# Patient Record
Sex: MALE | Race: WHITE | NOT HISPANIC OR LATINO | URBAN - METROPOLITAN AREA
[De-identification: names, ages, dates, MRNs, and addresses within clinical notes are randomized per-mention and may not be internally consistent; named-entity substitution may affect disease eponyms.]

---

## 2017-04-20 ENCOUNTER — OUTPATIENT (OUTPATIENT)
Dept: OUTPATIENT SERVICES | Facility: HOSPITAL | Age: 77
LOS: 1 days | End: 2017-04-20
Payer: MEDICARE

## 2017-04-20 VITALS
WEIGHT: 167.99 LBS | TEMPERATURE: 99 F | HEART RATE: 95 BPM | RESPIRATION RATE: 16 BRPM | HEIGHT: 68 IN | SYSTOLIC BLOOD PRESSURE: 140 MMHG | DIASTOLIC BLOOD PRESSURE: 72 MMHG | OXYGEN SATURATION: 98 %

## 2017-04-20 DIAGNOSIS — R94.39 ABNORMAL RESULT OF OTHER CARDIOVASCULAR FUNCTION STUDY: ICD-10-CM

## 2017-04-20 DIAGNOSIS — Z98.890 OTHER SPECIFIED POSTPROCEDURAL STATES: Chronic | ICD-10-CM

## 2017-04-20 LAB
ALBUMIN SERPL ELPH-MCNC: 4.4 G/DL — SIGNIFICANT CHANGE UP (ref 3.3–5)
ALP SERPL-CCNC: 64 U/L — SIGNIFICANT CHANGE UP (ref 40–120)
ALT FLD-CCNC: 19 U/L RC — SIGNIFICANT CHANGE UP (ref 10–45)
ANION GAP SERPL CALC-SCNC: 15 MMOL/L — SIGNIFICANT CHANGE UP (ref 5–17)
AST SERPL-CCNC: 19 U/L — SIGNIFICANT CHANGE UP (ref 10–40)
BILIRUB SERPL-MCNC: 0.5 MG/DL — SIGNIFICANT CHANGE UP (ref 0.2–1.2)
BUN SERPL-MCNC: 16 MG/DL — SIGNIFICANT CHANGE UP (ref 7–23)
CALCIUM SERPL-MCNC: 9.6 MG/DL — SIGNIFICANT CHANGE UP (ref 8.4–10.5)
CHLORIDE SERPL-SCNC: 104 MMOL/L — SIGNIFICANT CHANGE UP (ref 96–108)
CO2 SERPL-SCNC: 24 MMOL/L — SIGNIFICANT CHANGE UP (ref 22–31)
CREAT SERPL-MCNC: 0.97 MG/DL — SIGNIFICANT CHANGE UP (ref 0.5–1.3)
GLUCOSE SERPL-MCNC: 116 MG/DL — HIGH (ref 70–99)
HCT VFR BLD CALC: 45.5 % — SIGNIFICANT CHANGE UP (ref 39–50)
HGB BLD-MCNC: 15.1 G/DL — SIGNIFICANT CHANGE UP (ref 13–17)
MCHC RBC-ENTMCNC: 29.1 PG — SIGNIFICANT CHANGE UP (ref 27–34)
MCHC RBC-ENTMCNC: 33.3 GM/DL — SIGNIFICANT CHANGE UP (ref 32–36)
MCV RBC AUTO: 87.4 FL — SIGNIFICANT CHANGE UP (ref 80–100)
PLATELET # BLD AUTO: 166 K/UL — SIGNIFICANT CHANGE UP (ref 150–400)
POTASSIUM SERPL-MCNC: 4 MMOL/L — SIGNIFICANT CHANGE UP (ref 3.5–5.3)
POTASSIUM SERPL-SCNC: 4 MMOL/L — SIGNIFICANT CHANGE UP (ref 3.5–5.3)
PROT SERPL-MCNC: 7.3 G/DL — SIGNIFICANT CHANGE UP (ref 6–8.3)
RBC # BLD: 5.2 M/UL — SIGNIFICANT CHANGE UP (ref 4.2–5.8)
RBC # FLD: 11.8 % — SIGNIFICANT CHANGE UP (ref 10.3–14.5)
SODIUM SERPL-SCNC: 143 MMOL/L — SIGNIFICANT CHANGE UP (ref 135–145)
WBC # BLD: 6.8 K/UL — SIGNIFICANT CHANGE UP (ref 3.8–10.5)
WBC # FLD AUTO: 6.8 K/UL — SIGNIFICANT CHANGE UP (ref 3.8–10.5)

## 2017-04-20 PROCEDURE — 85027 COMPLETE CBC AUTOMATED: CPT

## 2017-04-20 PROCEDURE — C1769: CPT

## 2017-04-20 PROCEDURE — 93458 L HRT ARTERY/VENTRICLE ANGIO: CPT

## 2017-04-20 PROCEDURE — 80053 COMPREHEN METABOLIC PANEL: CPT

## 2017-04-20 PROCEDURE — 93005 ELECTROCARDIOGRAM TRACING: CPT

## 2017-04-20 PROCEDURE — 93010 ELECTROCARDIOGRAM REPORT: CPT

## 2017-04-20 PROCEDURE — C1887: CPT

## 2017-04-20 PROCEDURE — C1894: CPT

## 2017-04-20 RX ORDER — FLUVASTATIN SODIUM 80 MG/1
1 TABLET, FILM COATED, EXTENDED RELEASE ORAL
Qty: 0 | Refills: 0 | COMMUNITY

## 2017-04-20 RX ORDER — MAGNESIUM OXIDE 400 MG ORAL TABLET 241.3 MG
2 TABLET ORAL
Qty: 0 | Refills: 0 | COMMUNITY

## 2017-04-20 RX ORDER — VALSARTAN 80 MG/1
1 TABLET ORAL
Qty: 0 | Refills: 0 | COMMUNITY

## 2017-04-20 NOTE — H&P CARDIOLOGY - PMH
Chest pain    Hyperlipidemia    Hypertension Chest pain    Hyperlipidemia    Hypertension    Prostate cancer

## 2017-04-20 NOTE — H&P CARDIOLOGY - PSH
S/P appendectomy  done 15-20 yrs prior H/O prostate biopsy    S/P appendectomy  done 15-20 yrs prior

## 2017-04-20 NOTE — H&P CARDIOLOGY - FAMILY HISTORY
Father  Still living? No  Family history of MI (myocardial infarction), Age at diagnosis: Age Unknown

## 2017-04-20 NOTE — H&P CARDIOLOGY - HISTORY OF PRESENT ILLNESS
This is a 74 y/o gentleman with PMH HTN, HLD, who has been c/o occasional chest discomfort for past several months.  The pain comes on unrelated to activity and lasting few minutes which he was attributing to stomach origin like heartburn but lower in chest.  The pain does not radiate, no SOB, or diaphoresis associated with symptom, resolves on its own.  He underwent Stress ECHO which showed mild inducible regional wall abnormalities of the inferoapical wall, mildly positive for evidence of ischemia at maximum workload.  He present today for cardiac cath, denies any chest pain, SOB, palpitations at present. Last episode of chest pain about two weeks ago. This is a 75 y/o male, former smoker, with PMH of HTN, HLD, denies any further significant PMH, PSH or FH.  Presents to Md Braga with c/o of X2 syncopal episodes, that occurred 2 days ago, both episodes during the same day, lasted a few seconds, witnessed by pt's wife.  Espidoes happened in the bathroom, right after dinner, w/o any trauma or bodily injury. Patient denies syncope from ever happening before. Denies any other associated symptoms such as dizziness/lightheadness, dyspnea, chest pain, HA, N&V. 24 hour Holter monitor showed 18 short episodes of V tach, frequent ventricular ectopy and 4 sinus pauses of apporx 3 secs. This is a 75 y/o male, former smoker, with PMH of HTN, HLD, denies any further significant PMH, PSH or FH.  Presents to Md Braga with c/o of X2 syncopal episodes, that occurred 2 days ago, both episodes during the same day, lasted a few seconds, witnessed by pt's wife.  Espidoes happened in the bathroom, right after dinner, w/o any trauma or bodily injury. Patient denies syncope from ever happening before. Denies any other associated symptoms such as dizziness/lightheadness, palpitations, dyspnea, chest pain, HA, N&V. 24 hour Holter monitor showed 18 short episodes of V tach, frequent ventricular ectopy and 4 sinus pauses of apporx 3 secs. Patient also reports occasional chest pain, described as "twinges" at rest, that resolve promptly ( patient is a poor historian, unable to describe further his symptoms); on  5/14 cath showed non obstructive CAD. Had abnormal stress test.  Presents here today for cardiac cath with possible intervention Currently asymptomatic.     Of NOte: pt underwent prostate biopsy for elevated PSA on 12/2016 and is currently undergoing daily radiation fort total of 45 days total ( 4/19 last tx, 2.5 weeks in)- recently diagnosed low grade prostate cancer. This is a 75 y/o male, former smoker, with PMH of HTN, HLD, denies any further significant PMH, PSH or FH.  Presents to Md Braga with c/o of X2 syncopal episodes, that occurred 2 days ago, both episodes during the same day, lasted a few seconds, witnessed by pt's wife.  Espidoes happened in the bathroom, right after dinner, w/o any trauma or bodily injury. Patient denies syncope from ever happening before. Denies any other associated symptoms such as dizziness/lightheadness, palpitations, dyspnea, chest pain, HA, N&V. 24 hour Holter monitor showed 18 short episodes of V tach, frequent ventricular ectopy and 4 sinus pauses of apporx 3 secs. Patient also reports occasional chest pain, described as "twinges" at rest, that resolve promptly ( patient is a poor historian, unable to describe further his symptoms); on  5/14 cath showed non obstructive CAD. Had abnormal stress test ( as per Md note, report not available).  Presents here today for cardiac cath with possible intervention Currently asymptomatic.     Of NOte: pt underwent prostate biopsy for elevated PSA on 12/2016 and is currently undergoing daily radiation fort total of 45 days total ( 4/19 last tx, 2.5 weeks in)- recently diagnosed low grade prostate cancer.

## 2018-05-25 ENCOUNTER — APPOINTMENT (OUTPATIENT)
Dept: FAMILY MEDICINE | Facility: CLINIC | Age: 78
End: 2018-05-25
Payer: MEDICARE

## 2018-05-25 VITALS
BODY MASS INDEX: 25.58 KG/M2 | HEIGHT: 67 IN | WEIGHT: 163 LBS | DIASTOLIC BLOOD PRESSURE: 60 MMHG | HEART RATE: 94 BPM | SYSTOLIC BLOOD PRESSURE: 108 MMHG | TEMPERATURE: 97.9 F | RESPIRATION RATE: 17 BRPM | OXYGEN SATURATION: 96 %

## 2018-05-25 DIAGNOSIS — Z87.891 PERSONAL HISTORY OF NICOTINE DEPENDENCE: ICD-10-CM

## 2018-05-25 DIAGNOSIS — Z85.46 PERSONAL HISTORY OF MALIGNANT NEOPLASM OF PROSTATE: ICD-10-CM

## 2018-05-25 DIAGNOSIS — I10 ESSENTIAL (PRIMARY) HYPERTENSION: ICD-10-CM

## 2018-05-25 DIAGNOSIS — I48.0 PAROXYSMAL ATRIAL FIBRILLATION: ICD-10-CM

## 2018-05-25 DIAGNOSIS — R55 SYNCOPE AND COLLAPSE: ICD-10-CM

## 2018-05-25 PROCEDURE — 99202 OFFICE O/P NEW SF 15 MIN: CPT

## 2018-05-25 RX ORDER — FLUVASTATIN SODIUM 80 MG/1
80 TABLET, EXTENDED RELEASE ORAL
Qty: 90 | Refills: 0 | Status: ACTIVE | COMMUNITY
Start: 2018-04-09

## 2018-05-25 RX ORDER — RIVAROXABAN 20 MG/1
20 TABLET, FILM COATED ORAL
Qty: 90 | Refills: 0 | Status: ACTIVE | COMMUNITY
Start: 2018-03-19

## 2018-05-25 RX ORDER — OMEGA-3/DHA/EPA/FISH OIL 300-1000MG
CAPSULE ORAL
Refills: 0 | Status: ACTIVE | COMMUNITY

## 2018-05-25 NOTE — PHYSICAL EXAM
[No Acute Distress] : no acute distress [Clear to Auscultation] : lungs were clear to auscultation bilaterally [No Carotid Bruits] : no carotid bruits [Normal Gait] : normal gait [Alert and Oriented x3] : oriented to person, place, and time [de-identified] : irreg

## 2018-05-25 NOTE — ASSESSMENT
[FreeTextEntry1] : Pt presents to office for initial visit and transitioning into care.Pt has hx of prostate cancer and had radiation treatment which has been  completed last year. Pt had jx of fainting after urinating last year. Went to Cardio and referred to specialist for arrhythmia.Pt has hx of a-fib .No pacemaker but has "chip". On Xarelto.Denies palpitations. \par \par Pt to get previous records\par RTO prn.Discussed Immunizations.pt will get record from Pharmacy

## 2018-05-25 NOTE — HISTORY OF PRESENT ILLNESS
[FreeTextEntry8] : Pt presents to office for initial visit and transitioning into care.Pt has hx of prostate cancer and had radiation treatment which has been  completed last year. Pt had jx of fainting after urinating last year. Went to Cardio and referred to specialist for arrhythmia.Pt has hx of a-fib .No pacemaker but has "chip". On Xarelto.Denies palpitations.

## 2018-11-04 ENCOUNTER — INPATIENT (INPATIENT)
Facility: HOSPITAL | Age: 78
LOS: 0 days | Discharge: HOME | DRG: 641 | End: 2018-11-05
Attending: HOSPITALIST | Admitting: HOSPITALIST
Payer: COMMERCIAL

## 2018-11-04 ENCOUNTER — TRANSCRIPTION ENCOUNTER (OUTPATIENT)
Age: 78
End: 2018-11-04

## 2018-11-04 VITALS
HEART RATE: 70 BPM | RESPIRATION RATE: 18 BRPM | SYSTOLIC BLOOD PRESSURE: 114 MMHG | DIASTOLIC BLOOD PRESSURE: 66 MMHG | OXYGEN SATURATION: 100 %

## 2018-11-04 DIAGNOSIS — R55 SYNCOPE AND COLLAPSE: ICD-10-CM

## 2018-11-04 DIAGNOSIS — I48.91 UNSPECIFIED ATRIAL FIBRILLATION: ICD-10-CM

## 2018-11-04 DIAGNOSIS — R31.0 GROSS HEMATURIA: ICD-10-CM

## 2018-11-04 DIAGNOSIS — C61 MALIGNANT NEOPLASM OF PROSTATE: ICD-10-CM

## 2018-11-04 DIAGNOSIS — Z98.890 OTHER SPECIFIED POSTPROCEDURAL STATES: Chronic | ICD-10-CM

## 2018-11-04 DIAGNOSIS — Z29.9 ENCOUNTER FOR PROPHYLACTIC MEASURES, UNSPECIFIED: ICD-10-CM

## 2018-11-04 LAB
ALBUMIN SERPL ELPH-MCNC: 4.6 G/DL — SIGNIFICANT CHANGE UP (ref 3.3–5)
ALP SERPL-CCNC: 66 U/L — SIGNIFICANT CHANGE UP (ref 40–120)
ALT FLD-CCNC: 14 U/L — SIGNIFICANT CHANGE UP (ref 10–45)
ANION GAP SERPL CALC-SCNC: 15 MMOL/L — SIGNIFICANT CHANGE UP (ref 5–17)
APPEARANCE UR: ABNORMAL
APTT BLD: 25.2 SEC — LOW (ref 27.5–36.3)
AST SERPL-CCNC: 13 U/L — SIGNIFICANT CHANGE UP (ref 10–40)
BACTERIA # UR AUTO: NEGATIVE — SIGNIFICANT CHANGE UP
BASE EXCESS BLDV CALC-SCNC: -0.4 MMOL/L — SIGNIFICANT CHANGE UP (ref -2–2)
BASE EXCESS BLDV CALC-SCNC: 0.5 MMOL/L — SIGNIFICANT CHANGE UP (ref -2–2)
BASOPHILS # BLD AUTO: 0 K/UL — SIGNIFICANT CHANGE UP (ref 0–0.2)
BASOPHILS NFR BLD AUTO: 0.1 % — SIGNIFICANT CHANGE UP (ref 0–2)
BILIRUB SERPL-MCNC: 0.6 MG/DL — SIGNIFICANT CHANGE UP (ref 0.2–1.2)
BILIRUB UR-MCNC: NEGATIVE — SIGNIFICANT CHANGE UP
BUN SERPL-MCNC: 17 MG/DL — SIGNIFICANT CHANGE UP (ref 7–23)
CA-I SERPL-SCNC: 1.23 MMOL/L — SIGNIFICANT CHANGE UP (ref 1.12–1.3)
CA-I SERPL-SCNC: 1.3 MMOL/L — SIGNIFICANT CHANGE UP (ref 1.12–1.3)
CALCIUM SERPL-MCNC: 9.9 MG/DL — SIGNIFICANT CHANGE UP (ref 8.4–10.5)
CHLORIDE BLDV-SCNC: 107 MMOL/L — SIGNIFICANT CHANGE UP (ref 96–108)
CHLORIDE BLDV-SCNC: 110 MMOL/L — HIGH (ref 96–108)
CHLORIDE SERPL-SCNC: 105 MMOL/L — SIGNIFICANT CHANGE UP (ref 96–108)
CO2 BLDV-SCNC: 26 MMOL/L — SIGNIFICANT CHANGE UP (ref 22–30)
CO2 BLDV-SCNC: 26 MMOL/L — SIGNIFICANT CHANGE UP (ref 22–30)
CO2 SERPL-SCNC: 24 MMOL/L — SIGNIFICANT CHANGE UP (ref 22–31)
COLOR SPEC: ABNORMAL
CREAT SERPL-MCNC: 1.09 MG/DL — SIGNIFICANT CHANGE UP (ref 0.5–1.3)
DIFF PNL FLD: ABNORMAL
EOSINOPHIL # BLD AUTO: 0.2 K/UL — SIGNIFICANT CHANGE UP (ref 0–0.5)
EOSINOPHIL NFR BLD AUTO: 2.6 % — SIGNIFICANT CHANGE UP (ref 0–6)
EPI CELLS # UR: 1 /HPF — SIGNIFICANT CHANGE UP
GAS PNL BLDV: 141 MMOL/L — SIGNIFICANT CHANGE UP (ref 136–145)
GAS PNL BLDV: 142 MMOL/L — SIGNIFICANT CHANGE UP (ref 136–145)
GAS PNL BLDV: SIGNIFICANT CHANGE UP
GLUCOSE BLDV-MCNC: 172 MG/DL — HIGH (ref 70–99)
GLUCOSE BLDV-MCNC: 97 MG/DL — SIGNIFICANT CHANGE UP (ref 70–99)
GLUCOSE SERPL-MCNC: 183 MG/DL — HIGH (ref 70–99)
GLUCOSE UR QL: NEGATIVE — SIGNIFICANT CHANGE UP
HCO3 BLDV-SCNC: 24 MMOL/L — SIGNIFICANT CHANGE UP (ref 21–29)
HCO3 BLDV-SCNC: 25 MMOL/L — SIGNIFICANT CHANGE UP (ref 21–29)
HCT VFR BLD CALC: 43.8 % — SIGNIFICANT CHANGE UP (ref 39–50)
HCT VFR BLDA CALC: 42 % — SIGNIFICANT CHANGE UP (ref 39–50)
HCT VFR BLDA CALC: 44 % — SIGNIFICANT CHANGE UP (ref 39–50)
HGB BLD CALC-MCNC: 13.7 G/DL — SIGNIFICANT CHANGE UP (ref 13–17)
HGB BLD CALC-MCNC: 14.5 G/DL — SIGNIFICANT CHANGE UP (ref 13–17)
HGB BLD-MCNC: 14.5 G/DL — SIGNIFICANT CHANGE UP (ref 13–17)
HYALINE CASTS # UR AUTO: 2 /LPF — SIGNIFICANT CHANGE UP (ref 0–2)
INR BLD: 1.21 RATIO — HIGH (ref 0.88–1.16)
KETONES UR-MCNC: SIGNIFICANT CHANGE UP
LACTATE BLDV-MCNC: 1.6 MMOL/L — SIGNIFICANT CHANGE UP (ref 0.7–2)
LACTATE BLDV-MCNC: 3.2 MMOL/L — HIGH (ref 0.7–2)
LEUKOCYTE ESTERASE UR-ACNC: ABNORMAL
LYMPHOCYTES # BLD AUTO: 1.3 K/UL — SIGNIFICANT CHANGE UP (ref 1–3.3)
LYMPHOCYTES # BLD AUTO: 18.8 % — SIGNIFICANT CHANGE UP (ref 13–44)
MCHC RBC-ENTMCNC: 28.1 PG — SIGNIFICANT CHANGE UP (ref 27–34)
MCHC RBC-ENTMCNC: 33.1 GM/DL — SIGNIFICANT CHANGE UP (ref 32–36)
MCV RBC AUTO: 84.8 FL — SIGNIFICANT CHANGE UP (ref 80–100)
MONOCYTES # BLD AUTO: 0.5 K/UL — SIGNIFICANT CHANGE UP (ref 0–0.9)
MONOCYTES NFR BLD AUTO: 7.6 % — SIGNIFICANT CHANGE UP (ref 2–14)
NEUTROPHILS # BLD AUTO: 4.8 K/UL — SIGNIFICANT CHANGE UP (ref 1.8–7.4)
NEUTROPHILS NFR BLD AUTO: 71 % — SIGNIFICANT CHANGE UP (ref 43–77)
NITRITE UR-MCNC: NEGATIVE — SIGNIFICANT CHANGE UP
OTHER CELLS CSF MANUAL: 4 ML/DL — LOW (ref 18–22)
OTHER CELLS CSF MANUAL: 6 ML/DL — LOW (ref 18–22)
PCO2 BLDV: 43 MMHG — SIGNIFICANT CHANGE UP (ref 35–50)
PCO2 BLDV: 43 MMHG — SIGNIFICANT CHANGE UP (ref 35–50)
PH BLDV: 7.37 — SIGNIFICANT CHANGE UP (ref 7.35–7.45)
PH BLDV: 7.39 — SIGNIFICANT CHANGE UP (ref 7.35–7.45)
PH UR: 7 — SIGNIFICANT CHANGE UP (ref 5–8)
PLATELET # BLD AUTO: 172 K/UL — SIGNIFICANT CHANGE UP (ref 150–400)
PO2 BLDV: <50 MMHG — LOW (ref 25–45)
PO2 BLDV: <50 MMHG — LOW (ref 25–45)
POTASSIUM BLDV-SCNC: 4 MMOL/L — SIGNIFICANT CHANGE UP (ref 3.5–5.3)
POTASSIUM BLDV-SCNC: 4.3 MMOL/L — SIGNIFICANT CHANGE UP (ref 3.5–5.3)
POTASSIUM SERPL-MCNC: 4.4 MMOL/L — SIGNIFICANT CHANGE UP (ref 3.5–5.3)
POTASSIUM SERPL-SCNC: 4.4 MMOL/L — SIGNIFICANT CHANGE UP (ref 3.5–5.3)
PROT SERPL-MCNC: 6.9 G/DL — SIGNIFICANT CHANGE UP (ref 6–8.3)
PROT UR-MCNC: ABNORMAL
PROTHROM AB SERPL-ACNC: 13.9 SEC — HIGH (ref 10–12.9)
RBC # BLD: 5.16 M/UL — SIGNIFICANT CHANGE UP (ref 4.2–5.8)
RBC # FLD: 12 % — SIGNIFICANT CHANGE UP (ref 10.3–14.5)
RBC CASTS # UR COMP ASSIST: 295 /HPF — HIGH (ref 0–4)
SAO2 % BLDV: 22 % — LOW (ref 67–88)
SAO2 % BLDV: 32 % — LOW (ref 67–88)
SODIUM SERPL-SCNC: 144 MMOL/L — SIGNIFICANT CHANGE UP (ref 135–145)
SP GR SPEC: 1.02 — SIGNIFICANT CHANGE UP (ref 1.01–1.02)
TROPONIN T, HIGH SENSITIVITY RESULT: 10 NG/L — SIGNIFICANT CHANGE UP (ref 0–51)
UROBILINOGEN FLD QL: NEGATIVE — SIGNIFICANT CHANGE UP
WBC # BLD: 6.7 K/UL — SIGNIFICANT CHANGE UP (ref 3.8–10.5)
WBC # FLD AUTO: 6.7 K/UL — SIGNIFICANT CHANGE UP (ref 3.8–10.5)
WBC UR QL: 18 /HPF — HIGH (ref 0–5)

## 2018-11-04 PROCEDURE — 72100 X-RAY EXAM L-S SPINE 2/3 VWS: CPT | Mod: 26

## 2018-11-04 PROCEDURE — 70450 CT HEAD/BRAIN W/O DYE: CPT | Mod: 26

## 2018-11-04 PROCEDURE — 99236 HOSP IP/OBS SAME DATE HI 85: CPT | Mod: GC

## 2018-11-04 PROCEDURE — 72070 X-RAY EXAM THORAC SPINE 2VWS: CPT | Mod: 26

## 2018-11-04 PROCEDURE — 72125 CT NECK SPINE W/O DYE: CPT | Mod: 26

## 2018-11-04 RX ORDER — DOCUSATE SODIUM 100 MG
100 CAPSULE ORAL DAILY
Qty: 0 | Refills: 0 | Status: DISCONTINUED | OUTPATIENT
Start: 2018-11-04 | End: 2018-11-05

## 2018-11-04 RX ORDER — SODIUM CHLORIDE 9 MG/ML
1000 INJECTION INTRAMUSCULAR; INTRAVENOUS; SUBCUTANEOUS ONCE
Qty: 0 | Refills: 0 | Status: COMPLETED | OUTPATIENT
Start: 2018-11-04 | End: 2018-11-04

## 2018-11-04 RX ORDER — SENNA PLUS 8.6 MG/1
1 TABLET ORAL DAILY
Qty: 0 | Refills: 0 | Status: DISCONTINUED | OUTPATIENT
Start: 2018-11-04 | End: 2018-11-05

## 2018-11-04 RX ORDER — PREGABALIN 225 MG/1
1000 CAPSULE ORAL DAILY
Qty: 0 | Refills: 0 | Status: DISCONTINUED | OUTPATIENT
Start: 2018-11-04 | End: 2018-11-05

## 2018-11-04 RX ORDER — CHOLECALCIFEROL (VITAMIN D3) 125 MCG
1000 CAPSULE ORAL DAILY
Qty: 0 | Refills: 0 | Status: DISCONTINUED | OUTPATIENT
Start: 2018-11-04 | End: 2018-11-05

## 2018-11-04 RX ORDER — SODIUM CHLORIDE 9 MG/ML
1000 INJECTION INTRAMUSCULAR; INTRAVENOUS; SUBCUTANEOUS
Qty: 0 | Refills: 0 | Status: DISCONTINUED | OUTPATIENT
Start: 2018-11-04 | End: 2018-11-04

## 2018-11-04 RX ORDER — TETANUS TOXOID, REDUCED DIPHTHERIA TOXOID AND ACELLULAR PERTUSSIS VACCINE, ADSORBED 5; 2.5; 8; 8; 2.5 [IU]/.5ML; [IU]/.5ML; UG/.5ML; UG/.5ML; UG/.5ML
0.5 SUSPENSION INTRAMUSCULAR ONCE
Qty: 0 | Refills: 0 | Status: COMPLETED | OUTPATIENT
Start: 2018-11-04 | End: 2018-11-04

## 2018-11-04 RX ADMIN — SODIUM CHLORIDE 1000 MILLILITER(S): 9 INJECTION INTRAMUSCULAR; INTRAVENOUS; SUBCUTANEOUS at 12:13

## 2018-11-04 RX ADMIN — Medication 30 MILLILITER(S): at 15:28

## 2018-11-04 RX ADMIN — SODIUM CHLORIDE 1000 MILLILITER(S): 9 INJECTION INTRAMUSCULAR; INTRAVENOUS; SUBCUTANEOUS at 16:32

## 2018-11-04 RX ADMIN — SODIUM CHLORIDE 1000 MILLILITER(S): 9 INJECTION INTRAMUSCULAR; INTRAVENOUS; SUBCUTANEOUS at 11:12

## 2018-11-04 RX ADMIN — TETANUS TOXOID, REDUCED DIPHTHERIA TOXOID AND ACELLULAR PERTUSSIS VACCINE, ADSORBED 0.5 MILLILITER(S): 5; 2.5; 8; 8; 2.5 SUSPENSION INTRAMUSCULAR at 11:11

## 2018-11-04 RX ADMIN — SODIUM CHLORIDE 75 MILLILITER(S): 9 INJECTION INTRAMUSCULAR; INTRAVENOUS; SUBCUTANEOUS at 14:13

## 2018-11-04 NOTE — H&P ADULT - PROBLEM SELECTOR PLAN 1
-pt with syncopal episode after straining to urinate and feeling light headed, hot, sweaty before passing out on the steps. Pts wife ran to his side, pt was consciousness with no post ictal state. Loop recorder   -likely 2/2  vasovagal Vs acute blood loss anemia Vs seizure Vs arrythmia  -IVF  -monitor vs  -Monitor on tele  _CTH negative for acute intracranial leed -pt with syncopal episode after straining to urinate and feeling light headed, hot, sweaty before passing out on the steps. Pts wife ran to his side, pt was consciousness with no post ictal state. Loop recorder   -likely 2/2  vasovagal Vs acute blood loss anemia  Vs arrythmia Vs seizure  -IVF  -monitor hematuria  -Trend CBC  -monitor vs  -Monitor on tele  -CTH negative for acute intracranial bleed -pt with syncopal episode after straining to urinate and feeling light headed, hot, sweaty before passing out on the steps. Pts wife ran to his side, pt was consciousness with no post ictal state. Loop recorder showed no arrythmia  -likely 2/2  Orthostatic hypotension Vs vasovagal Vs acute blood loss anemia  Vs arrythmia Vs less likely seizure  -IVF  -monitor hematuria  -Trend CBC  -monitor vs  -Monitor on tele  -orthostatics negative s/p IVF resuscitation   -CTH negative for acute intracranial bleed

## 2018-11-04 NOTE — H&P ADULT - NSHPSOCIALHISTORY_GEN_ALL_CORE
lives with wife, retired , former 30 pack years smoking hx quit 20 years ago. Occasional glass of wine until 2017. Denies drug use.

## 2018-11-04 NOTE — DISCHARGE NOTE ADULT - HOSPITAL COURSE
77 YO m with PMhx of prostate cancer s/p RT in 2017, and Mhx of Afib on Xarelto who presented after a syncopal episode this AM. The pt has been having hematuria for the past 3 weeks, he went to his urologist who did a cystoscopy with biopsy on October 25. He initially held his xarelto, but restarted half the dose of xarelto 4 days prior to admission.  Pt states that he had 2 large hematuria, then he went back to bed. Pt woke up in the AM, strained to urinate and started to feel light headed, hot, sweaty, and then lost consciousness.  The wife rushed to his side where she found him alert with blood on the back of his head. She called EMS who rushed him to the hospital. Pt denied any shaking, loss of urine or bowels during the episode, post ictal state, chest pain, shortness of breath, abd pain, fever, or chills.    In the ED Pt is alert and oriented 114/76. HR70, Temp97.9F, Resp 18 sat 98 on RA. CTH superficial hematoma in the posterior head, but no fracture and no intracranial bleed. CT spine, tim thoracic and lumber spine were also negative for fracture. EKG shows Afib . hgb =14.5, Trops 11, repeat 10, U/A + for  NUN=470, WBC=18,small leukocytes. Loop recorder interrogated with no events. Pt was given 1L NS and TDaP vaccine. Cardiology saw him and said to hold xarelto, monitor trops and hydrate. Urology said to follow up with Dr Fletcher as out-patient 79 YO m with PMhx of prostate cancer s/p RT in 2017, and Mhx of Afib on Xarelto who presented after a syncopal episode this AM. The pt has been having hematuria for the past 3 weeks, he went to his urologist who did a cystoscopy with biopsy on October 25. He initially held his xarelto, but restarted half the dose of xarelto 4 days prior to admission.  Pt states that he had 2 large hematuria, then he went back to bed. Pt woke up in the AM, strained to urinate and started to feel light headed, hot, sweaty, and then lost consciousness.  The wife rushed to his side where she found him alert with blood on the back of his head. She called EMS who rushed him to the hospital. Pt denied any shaking, loss of urine or bowels during the episode, post ictal state, chest pain, shortness of breath, abd pain, fever, or chills.    In the ED Pt is alert and oriented 114/76. HR70, Temp97.9F, Resp 18 sat 98 on RA. CTH superficial hematoma in the posterior head, but no fracture and no intracranial bleed. CT spine, tim thoracic and lumber spine were also negative for fracture. EKG shows Afib . hgb =14.5, Trops 11, repeat 10, U/A + for  WVW=860, WBC=18,small leukocytes. Loop recorder interrogated with no events. Pt was given 1L NS and TDaP vaccine. Cardiology saw him and said to hold xarelto, monitor trops and hydrate. Urology said to follow up with Dr Fletcher as out-patient. Troponins were negative.     Patient was given IV fluids, and was stable for discharge. Patient with follow up with his Urologist, Dr. Fletcher and PMD, Dr. Braga. 77 YO m with PMhx of prostate cancer s/p RT in 2017, and Mhx of Afib on Xarelto who presented after a syncopal episode this AM. The pt has been having hematuria for the past 3 weeks, he went to his urologist who did a cystoscopy with biopsy on October 25. He initially held his xarelto, but restarted half the dose of xarelto 4 days prior to admission.  Pt states that he had 2 large hematuria, then he went back to bed. Pt woke up in the AM, strained to urinate and started to feel light headed, hot, sweaty, and then lost consciousness.  The wife rushed to his side where she found him alert with blood on the back of his head. She called EMS who rushed him to the hospital. Pt denied any shaking, loss of urine or bowels during the episode, post ictal state, chest pain, shortness of breath, abd pain, fever, or chills.    In the ED Pt is alert and oriented 114/76. HR70, Temp97.9F, Resp 18 sat 98 on RA. CTH superficial hematoma in the posterior head, but no fracture and no intracranial bleed. CT spine, tim thoracic and lumber spine were also negative for fracture. EKG shows Afib . hgb =14.5, Trops 11, repeat 10, U/A + for  QCE=275, WBC=18,small leukocytes.   Etiology of syncope is due to dehydration.   Loop recorder interrogated with no arrhythmia. Pt was given 1L NS and TDaP vaccine. Cardiology saw him and said to hold xarelto, monitor trops and hydrate. Urology said to follow up with Dr Fletcher as out-patient. Troponins were negative (ACS was ruled out).    Patient was given IV fluids, and was stable for discharge. Patient with follow up with his Urologist, Dr. Fletcher and PMD, Dr. Braga.

## 2018-11-04 NOTE — H&P ADULT - HISTORY OF PRESENT ILLNESS
79 YO F 77 YO m with PMhx of prostate cancer s/p RT in 2017, and Mhx of Afib on xarelto who presents after a syncopal episode this AM. Pt states that he urinated twice with large amounts fo hematuria (pt thinks it was 3 x 6Oz cups) and then he went back to bed. Pt woke up this AM, strained to urinate and started to feel light headed, hot, sweaty, and then lost consciousness. The pt last remembers being on the first step holding onto the railing.  The wife rushed to his side where she found him alert with blood on the back of his head. She called EMS who rushed him to the hospital. Pt denied any shaking, loss of urine or bowels during the episode, post ictal state, chest pain, shortness of breath, abd pain, fever, or chills.  Of note, the pt has been having hematuria for the past 3 weeks, he went to his urologist who did a cystoscopy with biopsy on October 25. he initially held his xarelto, but restarted half the dose of xarelto 4 days ago.   In the ED Pt is alert and oriented 114/76. HR70, Temp97.9F, Resp 18 sat 98 on RA. CTH superficial hematoma in the posterior head, but no fracture and no intracranial bleed. CT spine, tim thoracic and lumber spine were also negative for fracture. EKG shows Afib . hgb =14.5, Trops 11, repeat 10, U/A + for  NYV=683, WBC=18,small leukocytes. Loop recorder interrogated with no events. Pt was given 1L NS and TDaP vaccine. Cardiology saw him and said to hold xarelto, monitor trops and hydrate. Urology said to follow up with Dr Fletcher as out-patient

## 2018-11-04 NOTE — H&P ADULT - NSHPLABSRESULTS_GEN_ALL_CORE
14.5   6.7   )-----------( 172      ( 2018 07:45 )             43.8           144  |  105  |  17  ----------------------------<  183<H>  4.4   |  24  |  1.09    Ca    9.9      2018 07:45    TPro  6.9  /  Alb  4.6  /  TBili  0.6  /  DBili  x   /  AST  13  /  ALT  14  /  AlkPhos  66                Urinalysis Basic - ( 2018 11:12 )    Color: Dark Yellow / Appearance: Slightly Turbid / S.017 / pH: x  Gluc: x / Ketone: Trace  / Bili: Negative / Urobili: Negative   Blood: x / Protein: 30 mg/dL / Nitrite: Negative   Leuk Esterase: Small / RBC: 295 /hpf / WBC 18 /hpf   Sq Epi: x / Non Sq Epi: 1 /hpf / Bacteria: Negative        PT/INR - ( 2018 07:45 )   PT: 13.9 sec;   INR: 1.21 ratio         PTT - ( 2018 07:45 )  PTT:25.2 sec    Lactate Trend  < from: CT Head No Cont (18 @ 10:30) >    CT BRAIN:   No acute intracranial hemorrhage, significant mass effect or midline   shift.     No displaced calvarial fracture. Tiny scalp hematoma overlies the left   parietal region.    CT CERVICAL SPINE:   No acute fracture or subluxation. Moderate cervical spondylosis.    < end of copied text >    < from: Xray Thoracic Spine 1 View (18 @ 08:37) >    IMPRESSION: There is no evidence of an acute fracture or subluxation.   There are degenerative changes of the thoracolumbar spineas manifested   by anterior osteophyte formation. Vascular calcifications are noted.   There is mild spinal scoliosis. Cardiac loop recorder is seen overlying   the left chest wall.    < end of copied text >    < from: Xray Lumbar Spine AP + Lateral (18 @ 08:37) >    IMPRESSION: There is no evidence of an acute fracture or subluxation.   There are degenerative changes of the thoracolumbar spineas manifested   by anterior osteophyte formation. Vascular calcifications are noted.   There is mild spinal scoliosis. Cardiac loop recorder is seen overlying   the left chest wall.    < end of copied text >

## 2018-11-04 NOTE — H&P ADULT - ASSESSMENT
79 YO  with PMhx of prostate CA s/p RT, afib on xarelto, 3 weeks of hematuria s/p cystoscopy with bx who presents admitted for workup of syncopal episode. 79 YO  with PMhx of prostate CA s/p RT, afib on xarelto, 3 weeks of hematuria s/p cystoscopy with bx who presents admitted for workup of syncopal episode likely orthostatic hyptotnesion Vs Vasovagal Vs less likely seizure.

## 2018-11-04 NOTE — DISCHARGE NOTE ADULT - PLAN OF CARE
follow up with your PCP within 1 week of discharge You likely had a syncopal episode after volume loss, and you were given IV fluids in the ED. You should follow up with your PCP within 1 week of discharge. If you feel dizzy or lightheaded or have another episode of syncope, please come to the ER. Please hold your xarelto as per your PMD/cardiologist, Dr. Braga. Please follow up within 1 week of discharge. You should follow up with your urologist, Dr. Fletcher, within 1 week of discharge You likely had a syncopal episode due to dehydration, and you were given IV fluids in the hospital. You should follow up with your PCP within 1 week of discharge. If you feel dizzy or lightheaded or have another episode of syncope (fainting), please come to the ER. Please hold your Xarelto as per your PMD/cardiologist, Dr. Braga. Please follow up within 1 week of discharge.

## 2018-11-04 NOTE — H&P ADULT - NSHPREVIEWOFSYSTEMS_GEN_ALL_CORE
REVIEW OF SYSTEMS:    CONSTITUTIONAL: No weakness, fevers or chills  EYES/ENT: No visual changes;  No vertigo or throat pain   NECK: No pain or stiffness  RESPIRATORY: No cough, wheezing, hemoptysis; No shortness of breath  CARDIOVASCULAR: No chest pain or palpitations  GASTROINTESTINAL: +burning epigatric pain after eating associated with belching and sensation of food getting stuck at back of throat.  No nausea, vomiting, or hematemesis; No diarrhea or constipation. No melena or hematochezia.  GENITOURINARY: +hematuria, +intermittent dysuria   NEUROLOGICAL: No numbness or weakness  SKIN: 1 inch laceration in the back of head, no active bleeding. No itching, burning, rashes, or lesions   All other review of systems is negative unless indicated above.

## 2018-11-04 NOTE — ED PROVIDER NOTE - MEDICAL DECISION MAKING DETAILS
78M on xarelto presents s/p fall at home, +LOC. Story sounds like possible syncope. VSS. 2-3cm lac on back of head, stable, not bleeding actively. Will check labs, lactate, ct head/neck. 78M on xarelto presents s/p fall at home, +LOC. Story sounds like possible syncope. VSS. 2-3cm lac on back of head, stable, not bleeding actively. Will check labs, lactate, ct head/neck. ZR 78M on xarelto presents s/p fall at home, +LOC. Pt went from seated to standing, felt dizzy, light-headed, and passed out. He hit his head, has small laceration in occipital area. No CP, no SOB. During the night had blood in his urine, thought to be 2/2 recent biopsy of the prostate, found to be positive for CA. Story sounds like possible orthostatic vasovagal episode, will give tetanus shot, repair laceration, rehydrate, and re-evaluate. VSS. 2-3cm lac on back of head, stable, not bleeding actively. Will check labs, lactate, ct head/neck. ZR

## 2018-11-04 NOTE — CONSULT NOTE ADULT - ASSESSMENT
Impression/ Plan:   Patient's current episode of syncope appears related to orthostatic syncope from severe volume depletion during preceding micturition .   Cardiac work up including 12 lead EKG and cardiac enzymes first set do not support an ischemic etiology.   ILR interrogation was reportedly performed by EP this am and results did not point to any arrythmic cause of syncope.  Neuro work up included CT head and spine that did not show any acute pathology  Will monitor with 2 more sets of CE and overnite observation.   Hold Xarelto, continue hydration and measure BP to assess orthostasis   Definitive therapy for bladder pathology and addressing cause of intermittent persistent bleed to be addressed.  Call me for any questions or updates at [phone]
78yM s/p radiotherapy with gross hematuria and s/p fall

## 2018-11-04 NOTE — ED ADULT NURSE NOTE - OBJECTIVE STATEMENT
Pt on Xarelto fell one step while trying to reach for the stair railings. Pt stated that he lost consciousness. No distress. Breathing easy and non labored. + cut at the back of the head. Pt able to move all extremities. Pt very anxious. Emotional support offered.

## 2018-11-04 NOTE — H&P ADULT - ATTENDING COMMENTS
Syncopal episode likely due to dehydration as evidenced by elevated lactate, elevated albumin, and hyaline casts on the labs.   NS IV bolus given in the ED. Orthostatic vital signs are normal.   Loop recorder interrogated - no events recorded to indicate any kind of arrhythmia to cause the syncopal episode.   ACS was also ruled out- ECG is nonischemic. hs Trop x2 sets are negative.   Hematuria is not very significant and can be safely monitored by the urologist as outpatient.   Physical exam is generally benign- reproducible point tenderness to palpation over right lower mid-sternal area consistent with costochondritis.   I discussed the case with the patient, family, and the PMD/ cardiologist. From my perspective he is now medically stable for discharge. After an additional IV saline bolus tonight, he will be provided with the discharge papers.

## 2018-11-04 NOTE — DISCHARGE NOTE ADULT - PROVIDER TOKENS
FREE:[LAST:[Dr. Braga],PHONE:[(   )    -],FAX:[(   )    -],ADDRESS:[[address] [place] [address] , [place] [address]      Phone: [phone]]]

## 2018-11-04 NOTE — CONSULT NOTE ADULT - SUBJECTIVE AND OBJECTIVE BOX
Date of Admission:    Patient is a 78y old  Male who presents with a chief complaint of passing out this morning with resultant fall and trauma to back of head and upper back.    HPI: Patient has known cardiac history of HTN, recently diagnosed A fib - On Xarelto and s/p intracardiac loop recorder implantation at [facility].   Patient also has history of notable bradycardia in the past. Patient was seen by me in office about 2 weeks ago for concerns of intermittent urethral bleeding   post surgical procedure on urinary bladder performed by Dr. Fletcher at [place]. He also reported episodic dizziness. Patient was advised on holding Xarelto   and his BP meds were titrated. He states restarting Xarelto at half the prescribed dose few days ago after discussion with his urologist.   He had reported hesitancy in micturition for last several days, however at around 2 am -this morning - he states having voided an unusually large amount of urine   mixed with blood. He reports feeling light headed on waking up and subsequently had a sudden fall impacting the back of his head and back.   He states experiencing mild tenderness in substernal area post fall, no description of typical anginal CP. He states no symptoms of palpitations or SOB.   He feels somewhat weak, however states his mentation is back to his baseline. Mild tingling reported in bases of toes.      Allergies    No Known Allergies      SOCIAL HISTORY:    [x ] Non-smoker  [ ] Smoker  [ ] Alcohol      REVIEW OF SYSTEMS:  See HPI. Otherwise, 10 point ROS done and otherwise negative.    PHYSICAL EXAM:  T(C): 36.6 (11-04-18 @ 08:59), Max: 36.6 (11-04-18 @ 08:59)  HR: 78 (11-04-18 @ 08:59) (70 - 78)  BP: 114/67 (11-04-18 @ 08:59) (114/66 - 114/67)  RR: 18 (11-04-18 @ 08:59) (18 - 18)  SpO2: 100% (11-04-18 @ 08:59) (100% - 100%)  Wt(kg): --  I&O's Summary      Appearance: Normal	  HEENT:   Normal oral mucosa, PERRL, EOMI. Laceration noted in back of skull with spotting of blood  Lymphatic: No lymphadenopathy  Cardiovascular: Normal S1 S2, Irregular No JVD, No murmurs, No edema  Respiratory: Lungs clear to auscultation	  Psychiatry: A & O x 3, Mood & affect appropriate  Gastrointestinal:  Soft, Non-tender, + BS	  Skin: No rashes, No ecchymoses, No cyanosis	  Neurologic: Non-focal  Extremities: Normal range of motion, No clubbing, cyanosis or edema  Vascular: Peripheral pulses palpable 2+ bilaterally        LABS:	 	  CBC Full  -  ( 04 Nov 2018 07:45 )  WBC Count : 6.7 K/uL  Hemoglobin : 14.5 g/dL  Hematocrit : 43.8 %  Platelet Count - Automated : 172 K/uL  Mean Cell Volume : 84.8 fl  Mean Cell Hemoglobin : 28.1 pg  Mean Cell Hemoglobin Concentration : 33.1 gm/dL  Auto Neutrophil # : 4.8 K/uL  Auto Lymphocyte # : 1.3 K/uL  Auto Monocyte # : 0.5 K/uL  Auto Eosinophil # : 0.2 K/uL  Auto Basophil # : 0.0 K/uL  Auto Neutrophil % : 71.0 %  Auto Lymphocyte % : 18.8 %  Auto Monocyte % : 7.6 %  Auto Eosinophil % : 2.6 %  Auto Basophil % : 0.1 %    11-04    144  |  105  |  17  ----------------------------<  183<H>  4.4   |  24  |  1.09    Ca    9.9      04 Nov 2018 07:45    TPro  6.9  /  Alb  4.6  /  TBili  0.6  /  DBili  x   /  AST  13  /  ALT  14  /  AlkPhos  66  11-04      proBNP:   Lipid Profile:   HgA1c:   TSH:     EKG: A fib with V rate at 100. No acute ST-T abn  CARDIAC MARKERS: Ist set neg
Urology Consult Note    Chief Complaint:  Hematuria, s/p fall    History of Present Illness:  78yM with history of prostate cancer s/p radiotherapy with hematuria. His urologist is Dr. Fletcher. Recently underwent cystoscopy with bladder biopsy. He reports this morning he was walking up his stairs at home and then fell after losing  on the rail. Presented to ER. Reports persistent intermittent gross hematuria.    PAST MEDICAL & SURGICAL HISTORY:  Hematuria    FAMILY HISTORY:      Allergies    No Known Allergies    Intolerances        Social History:  Denies tobacco or alcohol use    Review of Systems:   Constitutional: No weight loss, no weakness  HEENT: No visual loss, no hearing loss, no sneezing  Skin: No rash or itching  CV: No chest pain, no chest pressure  Pulm: No shortness of breath, cough, or sputum  GI: No melena, no constipation  : Per HPI  Neuro: No headache, dizziness  MSK: No muscle pain, no joint pain  Heme: No anemia, bruising, or bleeding  Lymphatics: No enlarged nodes, no history of splenectomy  Psych: No depression of anxiety  Endo: No cold or heat intolerance  Allergies: No asthma, hives    Physical Exam:  Vital signs  T(C): 36.6 (18 @ 08:59), Max: 36.6 (18 @ 08:59)  HR: 78 (18 @ 08:59)  BP: 114/67 (18 @ 08:59)  SpO2: 100% (18 @ 08:59)  Wt(kg): --    Gen: No acute distress. Normal mood  HEENT: Normocephalic, neck supple  CV: Hemodynamically stable  Pulm: No increased work of breathing  Abd: soft, non-tender, non-distended  Back: No CVA tenderness, no midline pain  Extremities: No significant deformity or joint abnormality  Neuro: Alert & oriented x3, No focal deficits  Skin: Skin normal color, normal texture  Psych: Normal affect, normal behavior  : Nonpalpable bladder      Labs:       @ 07:45    WBC 6.7   / Hct 43.8  / SCr 1.09         144  |  105  |  17  ----------------------------<  183<H>  4.4   |  24  |  1.09    Ca    9.9      2018 07:45    TPro  6.9  /  Alb  4.6  /  TBili  0.6  /  DBili  x   /  AST  13  /  ALT  14  /  AlkPhos  66  11-04    PT/INR - ( 2018 07:45 )   PT: 13.9 sec;   INR: 1.21 ratio         PTT - ( 2018 07:45 )  PTT:25.2 sec  Urinalysis Basic - ( 2018 11:12 )    Color: Dark Yellow / Appearance: Slightly Turbid / S.017 / pH: x  Gluc: x / Ketone: Trace  / Bili: Negative / Urobili: Negative   Blood: x / Protein: 30 mg/dL / Nitrite: Negative   Leuk Esterase: Small / RBC: 295 /hpf / WBC 18 /hpf   Sq Epi: x / Non Sq Epi: 1 /hpf / Bacteria: Negative

## 2018-11-04 NOTE — DISCHARGE NOTE ADULT - CARE PROVIDER_API CALL
Dr. Braga,   [address] [place] [address] , [place] [address]      Phone: [phone]  Phone: (   )    -  Fax: (   )    -

## 2018-11-04 NOTE — ED ADULT NURSE NOTE - NSIMPLEMENTINTERV_GEN_ALL_ED
Implemented All Fall with Harm Risk Interventions:  [place] to call system. Call bell, personal items and telephone within reach. Instruct patient to call for assistance. Room bathroom lighting operational. Non-slip footwear when patient is off stretcher. Physically safe environment: no spills, clutter or unnecessary equipment. Stretcher in lowest position, wheels locked, appropriate side rails in place. Provide visual cue, wrist band, yellow gown, etc. Monitor gait and stability. Monitor for mental status changes and reorient to person, place, and time. Review medications for side effects contributing to fall risk. Reinforce activity limits and safety measures with patient and family. Provide visual clues: red socks.

## 2018-11-04 NOTE — H&P ADULT - PROBLEM SELECTOR PLAN 5
-DVT PP  -holding AC in setting of hematuria  -OOB with assistance -DVT PPx  -holding AC in setting of hematuria  -OOB with assistance

## 2018-11-04 NOTE — ED PROVIDER NOTE - PROGRESS NOTE DETAILS
Spoke to cardiologist (Omi) who recommended EP consult for loop interrogation. Dr. STERLING also indicated that she'll be in to see the pt once some the results are back. CARDIOLOGY PAGED CARDIOLOGY PAGED - seen by cardiologist, loop interrogated, no acute events found during time of reported fall.

## 2018-11-04 NOTE — CHART NOTE - NSCHARTNOTEFT_GEN_A_CORE
Device Interrogation Report    Interrogation of: Loop recorder    Indication for Interrogation: syncope    : [x] [employer] [ ] [employer] [ ] [place] [employer]    Model: REVEAL LINQ    Comments / Events:   AF 2% of the time  No VT  One episode of fast AF to 188 bpm on 6/18/18 that lasted for approx 19 hours.    Changes Made: none

## 2018-11-04 NOTE — DISCHARGE NOTE ADULT - CARE PLAN
Principal Discharge DX:	Syncope  Goal:	follow up with your PCP within 1 week of discharge  Assessment and plan of treatment:	You likely had a syncopal episode after volume loss, and you were given IV fluids in the ED. You should follow up with your PCP within 1 week of discharge. If you feel dizzy or lightheaded or have another episode of syncope, please come to the ER.  Secondary Diagnosis:	Atrial fibrillation, unspecified type  Assessment and plan of treatment:	Please hold your xarelto as per your PMD/cardiologist, Dr. Braga. Please follow up within 1 week of discharge.  Secondary Diagnosis:	Gross hematuria  Assessment and plan of treatment:	You should follow up with your urologist, Dr. Fletcher, within 1 week of discharge Principal Discharge DX:	Syncope  Goal:	follow up with your PCP within 1 week of discharge  Assessment and plan of treatment:	You likely had a syncopal episode due to dehydration, and you were given IV fluids in the hospital. You should follow up with your PCP within 1 week of discharge. If you feel dizzy or lightheaded or have another episode of syncope (fainting), please come to the ER.  Secondary Diagnosis:	Atrial fibrillation, unspecified type  Assessment and plan of treatment:	Please hold your Xarelto as per your PMD/cardiologist, Dr. Braga. Please follow up within 1 week of discharge.  Secondary Diagnosis:	Gross hematuria  Assessment and plan of treatment:	You should follow up with your urologist, Dr. Fletcher, within 1 week of discharge

## 2018-11-04 NOTE — CONSULT NOTE ADULT - PROBLEM SELECTOR RECOMMENDATION 9
-F/u urinalysis, urine culture  -Hgb is 14.5. 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 Recommend hydration. Will need to follow up with Dr. Fletcher regarding next steps in evaluation and management. I advised pt to call Dr. Fletcher's  following discharge.  -F/u cardiology reccs regarding etiology of his fall  -Thank you for the consult. Please call with questions

## 2018-11-04 NOTE — DISCHARGE NOTE ADULT - PATIENT PORTAL LINK FT
You can access the [facility] Patient Portal, offered by [facility], by registering with the following website: [email or web address]

## 2018-11-04 NOTE — H&P ADULT - NSHPPHYSICALEXAM_GEN_ALL_CORE
.  VITAL SIGNS:  T(C): 36.6 (11-04-18 @ 08:59), Max: 36.6 (11-04-18 @ 08:59)  T(F): 97.9 (11-04-18 @ 08:59), Max: 97.9 (11-04-18 @ 08:59)  HR: 70 (11-04-18 @ 13:11) (70 - 78)  BP: 110/60 (11-04-18 @ 13:11) (110/60 - 114/67)  BP(mean): --Negative orthostatics  RR: 18 (11-04-18 @ 13:11) (18 - 18)  SpO2: 98% (11-04-18 @ 13:11) (98% - 100%)  Wt(kg): --    PHYSICAL EXAM:    Constitutional: WDWN resting comfortably in bed; NAD  Head: NC/AT  Eyes: PERRL, EOMI, anicteric sclera  ENT: no nasal discharge, MMM  Neck: supple; no JVD appreciated  Respiratory: CTA B/L; no W/R/R, no increased work of breathing  Cardiac: +S1/S2; RRR; no M/R/G  Gastrointestinal: soft, NT/ND; no rebound or guarding; +BSx4  Extremities: WWP, no cyanosis; no peripheral edema  Musculoskeletal: NROM x4; no joint swelling, tenderness or erythema  Vascular: 2+ radial, DP pulses B/L  Dermatologic: 1 inch laceration in the back of his head. skin warm, dry and intact  Neurologic: Alert and oriented, no focal deficits appreciated, CN II-XII grossly intact and strength and sensation intact.  Psychiatric: affect and characteristics of appearance, verbalizations, behaviors are appropriate .  VITAL SIGNS:  T(C): 36.6 (18 @ 08:59), Max: 36.6 (18 @ 08:59)  T(F): 97.9 (18 @ 08:59), Max: 97.9 (18 @ 08:59)  HR: 70 (18 @ 13:11) (70 - 78)  BP: 110/60 (18 @ 13:11) (110/60 - 114/67)  Orthostatic BP: layin/50, HR 88; Sitting 102/50, HR90; Standing 106/62, .   BP(mean): --Negative orthostatics  RR: 18 (18 @ 13:11) (18 - 18)  SpO2: 98% (18 @ 13:11) (98% - 100%)  Wt(kg): --    PHYSICAL EXAM:    Constitutional: WDWN resting comfortably in bed; NAD  Head: NC/AT  Eyes: PERRL, EOMI, anicteric sclera  ENT: no nasal discharge, MMM  Neck: supple; no JVD appreciated  Respiratory: CTA B/L; no W/R/R, no increased work of breathing  Cardiac: +S1/S2; RRR; no M/R/G  Gastrointestinal: soft, NT/ND; no rebound or guarding; +BSx4  Extremities: WWP, no cyanosis; no peripheral edema  Musculoskeletal: NROM x4; no joint swelling, tenderness or erythema  Vascular: 2+ radial, DP pulses B/L  Dermatologic: 1 inch laceration in the back of his head. skin warm, dry and intact  Neurologic: Alert and oriented, no focal deficits appreciated, CN II-XII grossly intact and strength and sensation intact.  Psychiatric: affect and characteristics of appearance, verbalizations, behaviors are appropriate

## 2018-11-04 NOTE — DISCHARGE NOTE ADULT - MEDICATION SUMMARY - MEDICATIONS TO TAKE
I will START or STAY ON the medications listed below when I get home from the hospital:    magnesium  -- 250 milligram(s) by mouth once a day  -- Indication: For vitamin    Vitamin D3 2000 intl units oral tablet  -- 1 tab(s) by mouth once a day  -- Indication: For vitamin    Vitamin B-12 1000 mcg oral tablet  -- 1 tab(s) by mouth once a day  -- Indication: For vitamin

## 2018-11-04 NOTE — ED PROVIDER NOTE - OBJECTIVE STATEMENT
78M hx of atrial fib on xarelto presents s/p fall this AM in his house. Pt remembers getting out of bed early morning, heading downstairs, "not feeling right", started heading back to his bed and the last thing he remembers is reaching for the railing in the hallway before falling. He remembers nothing else and is pretty sure he lost consciousness. Nobody witnessed the fall. Of note he has had blood in his urine for the last week, is not sure why, has been to the doctor about it who told him to half his xarelto dose which he's been doing. Otherwise pt denies recent illness, fever/chills, hospitalizations, cp/sob, abp/n/v/d.

## 2018-11-04 NOTE — DISCHARGE NOTE ADULT - MEDICATION SUMMARY - MEDICATIONS TO STOP TAKING
I will STOP taking the medications listed below when I get home from the hospital:    Xarelto 20 mg oral tablet  -- 1 tab(s) by mouth once a day (in the evening)  Note: per pt on hold

## 2018-11-05 VITALS
SYSTOLIC BLOOD PRESSURE: 149 MMHG | OXYGEN SATURATION: 97 % | TEMPERATURE: 99 F | DIASTOLIC BLOOD PRESSURE: 79 MMHG | HEART RATE: 72 BPM | RESPIRATION RATE: 18 BRPM

## 2018-11-05 PROBLEM — C61 MALIGNANT NEOPLASM OF PROSTATE: Chronic | Status: ACTIVE | Noted: 2017-04-20

## 2018-11-05 LAB
ANION GAP SERPL CALC-SCNC: 13 MMOL/L — SIGNIFICANT CHANGE UP (ref 5–17)
BUN SERPL-MCNC: 15 MG/DL — SIGNIFICANT CHANGE UP (ref 7–23)
CALCIUM SERPL-MCNC: 9.7 MG/DL — SIGNIFICANT CHANGE UP (ref 8.4–10.5)
CHLORIDE SERPL-SCNC: 108 MMOL/L — SIGNIFICANT CHANGE UP (ref 96–108)
CO2 SERPL-SCNC: 22 MMOL/L — SIGNIFICANT CHANGE UP (ref 22–31)
CREAT SERPL-MCNC: 0.95 MG/DL — SIGNIFICANT CHANGE UP (ref 0.5–1.3)
GLUCOSE SERPL-MCNC: 108 MG/DL — HIGH (ref 70–99)
HCT VFR BLD CALC: 42.5 % — SIGNIFICANT CHANGE UP (ref 39–50)
HGB BLD-MCNC: 13.5 G/DL — SIGNIFICANT CHANGE UP (ref 13–17)
MAGNESIUM SERPL-MCNC: 2 MG/DL — SIGNIFICANT CHANGE UP (ref 1.6–2.6)
MCHC RBC-ENTMCNC: 28.3 PG — SIGNIFICANT CHANGE UP (ref 27–34)
MCHC RBC-ENTMCNC: 31.8 GM/DL — LOW (ref 32–36)
MCV RBC AUTO: 89.1 FL — SIGNIFICANT CHANGE UP (ref 80–100)
PHOSPHATE SERPL-MCNC: 2.6 MG/DL — SIGNIFICANT CHANGE UP (ref 2.5–4.5)
PLATELET # BLD AUTO: 176 K/UL — SIGNIFICANT CHANGE UP (ref 150–400)
POTASSIUM SERPL-MCNC: 3.9 MMOL/L — SIGNIFICANT CHANGE UP (ref 3.5–5.3)
POTASSIUM SERPL-SCNC: 3.9 MMOL/L — SIGNIFICANT CHANGE UP (ref 3.5–5.3)
RBC # BLD: 4.77 M/UL — SIGNIFICANT CHANGE UP (ref 4.2–5.8)
RBC # FLD: 13.1 % — SIGNIFICANT CHANGE UP (ref 10.3–14.5)
SODIUM SERPL-SCNC: 143 MMOL/L — SIGNIFICANT CHANGE UP (ref 135–145)
WBC # BLD: 7.01 K/UL — SIGNIFICANT CHANGE UP (ref 3.8–10.5)
WBC # FLD AUTO: 7.01 K/UL — SIGNIFICANT CHANGE UP (ref 3.8–10.5)

## 2018-11-05 PROCEDURE — 99239 HOSP IP/OBS DSCHRG MGMT >30: CPT

## 2018-11-05 RX ORDER — RIVAROXABAN 15 MG-20MG
1 KIT ORAL
Qty: 0 | Refills: 0 | COMMUNITY

## 2018-11-05 RX ORDER — SODIUM CHLORIDE 9 MG/ML
500 INJECTION INTRAMUSCULAR; INTRAVENOUS; SUBCUTANEOUS ONCE
Qty: 0 | Refills: 0 | Status: COMPLETED | OUTPATIENT
Start: 2018-11-05 | End: 2018-11-05

## 2018-11-05 RX ORDER — CHOLECALCIFEROL (VITAMIN D3) 125 MCG
1 CAPSULE ORAL
Qty: 0 | Refills: 0 | COMMUNITY

## 2018-11-05 RX ORDER — PREGABALIN 225 MG/1
1 CAPSULE ORAL
Qty: 0 | Refills: 0 | COMMUNITY

## 2018-11-05 RX ADMIN — PREGABALIN 1000 MICROGRAM(S): 225 CAPSULE ORAL at 11:35

## 2018-11-05 RX ADMIN — SENNA PLUS 1 TABLET(S): 8.6 TABLET ORAL at 04:09

## 2018-11-05 RX ADMIN — Medication 100 MILLIGRAM(S): at 05:44

## 2018-11-05 RX ADMIN — Medication 1000 UNIT(S): at 11:35

## 2018-11-05 RX ADMIN — SODIUM CHLORIDE 1000 MILLILITER(S): 9 INJECTION INTRAMUSCULAR; INTRAVENOUS; SUBCUTANEOUS at 10:06

## 2018-11-05 NOTE — PROGRESS NOTE ADULT - PROBLEM SELECTOR PLAN 1
-likely 2/2  Orthostatic hypotension Vs vasovagal Vs less likely seizure  -received IVF with resolution of symptoms  -no events on tele  -cbc stable  -monitor vs  -Monitor on tele  -orthostatics negative s/p IVF resuscitation   -CTH negative for acute intracranial bleed

## 2018-11-05 NOTE — PROGRESS NOTE ADULT - ASSESSMENT
Impression/ Plan:   Patient's current episode of syncope appears related to orthostatic syncope from severe volume depletion during preceding micturition .   Patient remained stable over overnite observation and monitoring. Orthostasis was noted as positive.  Cardiac work up including 12 lead EKG and cardiac enzymes do not support an ischemic etiology.   ILR interrogation was reportedly performed by EP on 11/418 and results did not point to any arrythmic cause of syncope.  Neuro work up included CT head and spine that did not show any acute pathology    Hold Xarelto, continue hydration. Measures to prevent orthostasis discussed with patient.  Definitive therapy for bladder pathology and addressing cause of intermittent persistent bleed to be addressed as out patient.  DC home later today if stable. Follow up with me in office in 1-2 weeks. Call [phone] to make appointment.  Call me for any questions or updates at [phone]

## 2018-11-05 NOTE — PROGRESS NOTE ADULT - SUBJECTIVE AND OBJECTIVE BOX
HPI:  77 YO m with PMhx of prostate cancer s/p RT in 2017, and Mhx of Afib on xarelto who presents after a syncopal episode this AM. Pt states that he urinated twice with large amounts fo hematuria (pt thinks it was 3 x 6Oz cups) and then he went back to bed. Pt woke up this AM, strained to urinate and started to feel light headed, hot, sweaty, and then lost consciousness. The pt last remembers being on the first step holding onto the railing.  The wife rushed to his side where she found him alert with blood on the back of his head. She called EMS who rushed him to the hospital. Pt denied any shaking, loss of urine or bowels during the episode, post ictal state, chest pain, shortness of breath, abd pain, fever, or chills.  Of note, the pt has been having hematuria for the past 3 weeks, he went to his urologist who did a cystoscopy with biopsy on October 25. he initially held his xarelto, but restarted half the dose of xarelto 4 days ago.   In the ED Pt is alert and oriented 114/76. HR70, Temp97.9F, Resp 18 sat 98 on RA. CTH superficial hematoma in the posterior head, but no fracture and no intracranial bleed. CT spine, tim thoracic and lumber spine were also negative for fracture. EKG shows Afib . hgb =14.5, Trops 11, repeat 10, U/A + for  LVY=691, WBC=18,small leukocytes. Loop recorder interrogated with no events. Pt was given 1L NS and TDaP vaccine. Cardiology saw him and said to hold xarelto, monitor trops and hydrate. Urology said to follow up with Dr Fletcher as out-patient (04 Nov 2018 14:13)      Review Of Systems:  Negative except as documented above    MEDICATIONS  (STANDING):  cholecalciferol 1000 Unit(s) Oral daily  cyanocobalamin 1000 MICROGram(s) Oral daily    MEDICATIONS  (PRN):  aluminum hydroxide/magnesium hydroxide/simethicone Suspension 30 milliLiter(s) Oral every 6 hours PRN Dyspepsia  docusate sodium 100 milliGRAM(s) Oral daily PRN Constipation  senna 1 Tablet(s) Oral daily PRN Constipation      Daily Height in cm: 170.18 (04 Nov 2018 15:49)    Daily       PHYSICAL EXAM:  Vital Signs Last 24 Hrs  T(C): 37.1 (05 Nov 2018 04:04), Max: 37.1 (05 Nov 2018 04:04)  T(F): 98.8 (05 Nov 2018 04:04), Max: 98.8 (05 Nov 2018 04:04)  HR: 77 (05 Nov 2018 04:04) (65 - 77)  BP: 131/66 (05 Nov 2018 04:04) (104/56 - 131/66)  BP(mean): --  RR: 18 (05 Nov 2018 04:04) (18 - 18)  SpO2: 97% (05 Nov 2018 04:04) (96% - 99%)  I&O's Summary    04 Nov 2018 07:01  -  05 Nov 2018 07:00  --------------------------------------------------------  IN: 1460 mL / OUT: 175 mL / NET: 1285 mL        Appearance: Normal	  HEENT:   Normal oral mucosa, PERRL, EOMI	  Lymphatic: No lymphadenopathy  Cardiovascular: Irregular S1 S2, No JVD, No murmurs, No edema  Respiratory: Lungs clear to auscultation	  Psychiatry: A & O x 3, Mood & affect appropriate  Gastrointestinal:  Soft, Non-tender, + BS	  Skin: No rashes, No ecchymoses, No cyanosis	  Neurologic: Non-focal  Extremities: Normal range of motion, No clubbing, cyanosis or edema  Vascular: Peripheral pulses palpable 2+ bilaterally                          13.5   7.01  )-----------( 176      ( 05 Nov 2018 08:20 )             42.5     11-05    143  |  108  |  15  ----------------------------<  108<H>  3.9   |  22  |  0.95    Ca    9.7      05 Nov 2018 06:39  Phos  2.6     11-05  Mg     2.0     11-05    TPro  6.9  /  Alb  4.6  /  TBili  0.6  /  DBili  x   /  AST  13  /  ALT  14  /  AlkPhos  66  11-04    PT/INR - ( 04 Nov 2018 07:45 )   PT: 13.9 sec;   INR: 1.21 ratio         PTT - ( 04 Nov 2018 07:45 )  PTT:25.2 sec    EKG: A fib with V rate at 100. No acute ST-T abn  CARDIAC MARKERS: Trop neg

## 2018-11-05 NOTE — PROGRESS NOTE ADULT - ASSESSMENT
79 YO  with PMhx of prostate CA s/p RT, afib on xarelto, 3 weeks of hematuria s/p cystoscopy with bx who presents admitted for workup of syncopal episode likely orthostatic hyptotnesion Vs Vasovagal Vs less likely seizure.

## 2018-11-05 NOTE — PROGRESS NOTE ADULT - SUBJECTIVE AND OBJECTIVE BOX
Quinn Morales  PGY-1, [phone]    Patient is a 78y old  Male who presents with a chief complaint of syncope (2018 10:26)      SUBJECTIVE / OVERNIGHT EVENTS: No acute events overnight. Pt states that he had some mild hematuria overnight, but otherwise feels well. He denies Lightheadedness, dizziness, headache, chest pain, palpitations, shortness of breath, abd pain, dysuria, frequency, urgency. Pt feels well and is ready to go home today. Tele Sinus 70-90's with PACs    MEDICATIONS  (STANDING):  cholecalciferol 1000 Unit(s) Oral daily  cyanocobalamin 1000 MICROGram(s) Oral daily    MEDICATIONS  (PRN):  aluminum hydroxide/magnesium hydroxide/simethicone Suspension 30 milliLiter(s) Oral every 6 hours PRN Dyspepsia  docusate sodium 100 milliGRAM(s) Oral daily PRN Constipation  senna 1 Tablet(s) Oral daily PRN Constipation      OBJECTIVE:    Vital Signs Last 24 Hrs  T(C): 37.1 (2018 11:46), Max: 37.1 (2018 04:04)  T(F): 98.8 (2018 11:46), Max: 98.8 (2018 04:04)  HR: 72 (2018 11:46) (65 - 77)  BP: 149/79 (2018 11:46) (104/56 - 149/79)  BP(mean): --  RR: 18 (2018 11:46) (18 - 18)  SpO2: 97% (2018 11:46) (96% - 99%)    CAPILLARY BLOOD GLUCOSE        I&O's Summary    2018 07:01  -  2018 07:00  --------------------------------------------------------  IN: 1460 mL / OUT: 175 mL / NET: 1285 mL    2018 07:01  -  2018 13:49  --------------------------------------------------------  IN: 240 mL / OUT: 0 mL / NET: 240 mL  PHYSICAL EXAM:    Constitutional: WDWN resting comfortably in bed; NAD  Head: NC/AT  Eyes: PERRL, EOMI, anicteric sclera  ENT: no nasal discharge, MMM  Neck: supple; no JVD appreciated  Respiratory: CTA B/L; no W/R/R, no increased work of breathing  Cardiac: +S1/S2; RRR; no M/R/G  Gastrointestinal: soft, NT/ND; no rebound or guarding; +BSx4  Extremities: WWP, no cyanosis; no peripheral edema  Musculoskeletal: NROM x4; no joint swelling, tenderness or erythema  Vascular: 2+ radial, DP pulses B/L  Dermatologic: 1 inch laceration in the back of his head, covered. skin warm, dry and intact  Neurologic: Alert and oriented, no focal deficits appreciated, CN II-XII grossly intact and strength and sensation intact.  Psychiatric: affect and characteristics of appearance, verbalizations, behaviors are appropriate    LABS:                        13.5   7.01  )-----------( 176      ( 2018 08:20 )             42.5     Auto Eosinophil # x     / Auto Eosinophil % x     / Auto Neutrophil # x     / Auto Neutrophil % x     / BANDS % x                            14.5   6.7   )-----------( 172      ( 2018 07:45 )             43.8     Auto Eosinophil # 0.2   / Auto Eosinophil % 2.6   / Auto Neutrophil # 4.8   / Auto Neutrophil % 71.0  / BANDS % x            143  |  108  |  15  ----------------------------<  108<H>  3.9   |  22  |  0.95      144  |  105  |  17  ----------------------------<  183<H>  4.4   |  24  |  1.09    Ca    9.7      2018 06:39  Mg     2.0       Phos  2.6       TPro  6.9  /  Alb  4.6  /  TBili  0.6  /  DBili  x   /  AST  13  /  ALT  14  /  AlkPhos  66      PT/INR - ( 2018 07:45 )   PT: 13.9 sec;   INR: 1.21 ratio         PTT - ( 2018 07:45 )  PTT:25.2 sec      Urinalysis Basic - ( 2018 11:12 )    Color: Dark Yellow / Appearance: Slightly Turbid / S.017 / pH: x  Gluc: x / Ketone: Trace  / Bili: Negative / Urobili: Negative   Blood: x / Protein: 30 mg/dL / Nitrite: Negative   Leuk Esterase: Small / RBC: 295 /hpf / WBC 18 /hpf   Sq Epi: x / Non Sq Epi: 1 /hpf / Bacteria: Negative    RADIOLOGY & ADDITIONAL TESTS:  (Imaging Personally Reviewed)    Consultant(s) Notes Reviewed:      Care Discussed with Consultants/Other Providers:

## 2018-11-11 PROCEDURE — 70450 CT HEAD/BRAIN W/O DYE: CPT

## 2018-11-11 PROCEDURE — 80053 COMPREHEN METABOLIC PANEL: CPT

## 2018-11-11 PROCEDURE — 85730 THROMBOPLASTIN TIME PARTIAL: CPT

## 2018-11-11 PROCEDURE — 82330 ASSAY OF CALCIUM: CPT

## 2018-11-11 PROCEDURE — 72125 CT NECK SPINE W/O DYE: CPT

## 2018-11-11 PROCEDURE — 83735 ASSAY OF MAGNESIUM: CPT

## 2018-11-11 PROCEDURE — 84295 ASSAY OF SERUM SODIUM: CPT

## 2018-11-11 PROCEDURE — 82803 BLOOD GASES ANY COMBINATION: CPT

## 2018-11-11 PROCEDURE — 83605 ASSAY OF LACTIC ACID: CPT

## 2018-11-11 PROCEDURE — 81001 URINALYSIS AUTO W/SCOPE: CPT

## 2018-11-11 PROCEDURE — 80048 BASIC METABOLIC PNL TOTAL CA: CPT

## 2018-11-11 PROCEDURE — 84100 ASSAY OF PHOSPHORUS: CPT

## 2018-11-11 PROCEDURE — 85027 COMPLETE CBC AUTOMATED: CPT

## 2018-11-11 PROCEDURE — 82947 ASSAY GLUCOSE BLOOD QUANT: CPT

## 2018-11-11 PROCEDURE — 72100 X-RAY EXAM L-S SPINE 2/3 VWS: CPT

## 2018-11-11 PROCEDURE — 93005 ELECTROCARDIOGRAM TRACING: CPT

## 2018-11-11 PROCEDURE — 85610 PROTHROMBIN TIME: CPT

## 2018-11-11 PROCEDURE — 85014 HEMATOCRIT: CPT

## 2018-11-11 PROCEDURE — 72070 X-RAY EXAM THORAC SPINE 2VWS: CPT

## 2018-11-11 PROCEDURE — 84484 ASSAY OF TROPONIN QUANT: CPT

## 2018-11-11 PROCEDURE — 90715 TDAP VACCINE 7 YRS/> IM: CPT

## 2018-11-11 PROCEDURE — 82435 ASSAY OF BLOOD CHLORIDE: CPT

## 2018-11-11 PROCEDURE — 96360 HYDRATION IV INFUSION INIT: CPT

## 2018-11-11 PROCEDURE — 99285 EMERGENCY DEPT VISIT HI MDM: CPT | Mod: 25

## 2018-11-11 PROCEDURE — 90471 IMMUNIZATION ADMIN: CPT

## 2018-11-11 PROCEDURE — 84132 ASSAY OF SERUM POTASSIUM: CPT

## 2024-02-06 PROBLEM — C61 MALIGNANT NEOPLASM OF PROSTATE: Chronic | Status: ACTIVE | Noted: 2018-11-04

## 2024-02-06 PROBLEM — I48.91 UNSPECIFIED ATRIAL FIBRILLATION: Chronic | Status: ACTIVE | Noted: 2018-11-04

## 2024-02-08 ENCOUNTER — APPOINTMENT (OUTPATIENT)
Dept: RADIOLOGY | Facility: CLINIC | Age: 84
End: 2024-02-08

## 2024-02-10 ENCOUNTER — OUTPATIENT (OUTPATIENT)
Dept: OUTPATIENT SERVICES | Facility: HOSPITAL | Age: 84
LOS: 1 days | End: 2024-02-10
Payer: MEDICARE

## 2024-02-10 ENCOUNTER — APPOINTMENT (OUTPATIENT)
Dept: RADIOLOGY | Facility: IMAGING CENTER | Age: 84
End: 2024-02-10
Payer: MEDICARE

## 2024-02-10 DIAGNOSIS — Z98.890 OTHER SPECIFIED POSTPROCEDURAL STATES: Chronic | ICD-10-CM

## 2024-02-10 DIAGNOSIS — R10.33 PERIUMBILICAL PAIN: ICD-10-CM

## 2024-02-10 PROCEDURE — 74019 RADEX ABDOMEN 2 VIEWS: CPT | Mod: 26

## 2024-02-10 PROCEDURE — 74019 RADEX ABDOMEN 2 VIEWS: CPT

## 2024-03-08 ENCOUNTER — OUTPATIENT (OUTPATIENT)
Dept: OUTPATIENT SERVICES | Facility: HOSPITAL | Age: 84
LOS: 1 days | End: 2024-03-08
Payer: MEDICARE

## 2024-03-08 ENCOUNTER — APPOINTMENT (OUTPATIENT)
Dept: RADIOLOGY | Facility: HOSPITAL | Age: 84
End: 2024-03-08

## 2024-03-08 DIAGNOSIS — R13.10 DYSPHAGIA, UNSPECIFIED: ICD-10-CM

## 2024-03-08 DIAGNOSIS — Z98.890 OTHER SPECIFIED POSTPROCEDURAL STATES: Chronic | ICD-10-CM

## 2024-03-08 PROCEDURE — 74246 X-RAY XM UPR GI TRC 2CNTRST: CPT

## 2024-03-08 PROCEDURE — 74246 X-RAY XM UPR GI TRC 2CNTRST: CPT | Mod: 26

## 2024-05-25 ENCOUNTER — NON-APPOINTMENT (OUTPATIENT)
Age: 84
End: 2024-05-25